# Patient Record
Sex: FEMALE | Race: WHITE
[De-identification: names, ages, dates, MRNs, and addresses within clinical notes are randomized per-mention and may not be internally consistent; named-entity substitution may affect disease eponyms.]

---

## 2017-09-25 ENCOUNTER — HOSPITAL ENCOUNTER (OUTPATIENT)
Dept: HOSPITAL 61 - PCVCIMAG | Age: 62
Discharge: HOME | End: 2017-09-25
Attending: INTERNAL MEDICINE
Payer: COMMERCIAL

## 2017-09-25 DIAGNOSIS — I10: ICD-10-CM

## 2017-09-25 DIAGNOSIS — I48.91: Primary | ICD-10-CM

## 2017-09-25 PROCEDURE — 93325 DOPPLER ECHO COLOR FLOW MAPG: CPT

## 2017-09-25 PROCEDURE — 93351 STRESS TTE COMPLETE: CPT

## 2017-09-25 NOTE — PCVCIMAG
--------------- APPROVED REPORT --------------





Exam:  Stress Echocardiogram

Indication: Atrial Fibrillation, hypertension,

Patient Location: Echo lab

Stress Nurse: Judy Montiel RN

Status: routine



Ht: 5 ft 7 in  

HR: 78 bpm      BP: 140/88 mmHg

Rhythm: NSR



Procedure

The patient underwent an Exercise Stress Test using the Chaparro 

Protocol. Blood pressure, heart rate, and EKG were monitored.

An Echocardiogram was performed by technician in four stages in quad 

fashion.  At peak stress, four selected images were obtained and 

placed side by side with resting images for comparison.



Stress Test Details

Stress Test:  Exercise stress testing was performed using a Chaparro 

protocol.

HR

Resting HR:            78 bpmMax Heart Rate (APMHR): 159 bpm 

Max HR Achieved:  153 bpmTarget HR (85% APMHR): 135 bpm

% of APMHR:         96

HR response to stress: Normal HR response to stress



BP

Resting BP:  140/88 mmHg

Max BP:       160/88 mmHg



ECG

Resting ECG:  Sinus Rhythm

Stress ECG:     Sinus Rhythm

Arrhythmia:    APC's



Clinical

Reason for Termination: Maximal effort

Exercise duration: 9 min sec

Highest Stage Achieved: Stage 3: 3.4 mph at 14% grade. 

Exercise capacity: 10.40 METs



Pre-Stress Echo

The resting Echocardiogram showed normal left ventricular 

contractility with an estimated Ejection Fraction of about &gt;55%. 

Normal wall motion in all segments on baseline images.



Post-Stress Echo

The stress Echocardiogram showed normal left ventricular 

contractility with an estimated Ejection Fraction of about 60-65%. 

Normal augmentation of wall motion in all segments on post stress 

images.



Clinical

No clinical or ECG evidence for ischemia.



Conclusion

Clinical Response:  Non-ischemic

Exercise Capacity:  Average

Stress ECG Response:  Non-ischemic

Stress Echo Images:  Non-ischemic

The left ventricle is normal in size and wall thickness in both the 

rest and stress images.



Other Information

Study Quality: Adequate



&lt;Conclusion&gt;

The left ventricle is normal in size and wall thickness in both the 

rest and stress images.

## 2019-11-07 ENCOUNTER — HOSPITAL ENCOUNTER (OUTPATIENT)
Dept: HOSPITAL 61 - PCVCIMAG | Age: 64
Discharge: HOME | End: 2019-11-07
Attending: INTERNAL MEDICINE
Payer: COMMERCIAL

## 2019-11-07 DIAGNOSIS — Z88.0: ICD-10-CM

## 2019-11-07 DIAGNOSIS — E78.5: ICD-10-CM

## 2019-11-07 DIAGNOSIS — I48.0: ICD-10-CM

## 2019-11-07 DIAGNOSIS — Z88.8: ICD-10-CM

## 2019-11-07 DIAGNOSIS — Z91.041: ICD-10-CM

## 2019-11-07 DIAGNOSIS — Z80.9: ICD-10-CM

## 2019-11-07 DIAGNOSIS — Z72.89: ICD-10-CM

## 2019-11-07 DIAGNOSIS — I07.1: Primary | ICD-10-CM

## 2019-11-07 PROCEDURE — 93325 DOPPLER ECHO COLOR FLOW MAPG: CPT

## 2019-11-07 PROCEDURE — 93351 STRESS TTE COMPLETE: CPT

## 2019-11-07 NOTE — [facility]AG
--------------- APPROVED REPORT --------------





Study performed:  11/07/2019 14:59:46



Exam:  Stress Echocardiogram

Indication: Hyperlipidemia, Dyspnea , Hypertension

Patient Location: Echo lab

Stress Nurse: Judy Montiel RN

Room #: 1

Status: routine



Ht: 5 ft 7 in  

HR: 66 bpm      BP: 128/78 mmHg

Rhythm: NSR



Procedure

The patient underwent an Exercise Stress Test using the Chaparro 

Protocol. Blood pressure, heart rate, and EKG were monitored.

An Echocardiogram was performed by technician in four stages in quad 

fashion.  At peak stress, four selected images were obtained and 

placed side by side with resting images for comparison.



Stress Test Details

Stress Test:  Exercise stress testing was performed using a Chaparro 

protocol.

HR

Resting HR:            66 bpmMax Heart Rate (APMHR): 157 bpm 

Max HR Achieved:  155 bpmTarget HR (85% APMHR): 133 bpm

% of APMHR:         98

Recovery HR:            81 bpm

HR response to stress: Normal HR response to stress



BP

Resting BP:  128/78 mmHg

Max BP:       184/70 mmHg

Recovery BP:       144/70 mmHg

BP response to stress: Normal blood pressure response to 

stress.

ECG

Resting ECG:  Sinus Rhythm

Stress ECG:     Sinus Rhythm

Recovery ECG: Sinus Rhythm



Clinical

Reason for Termination: Maximal effort

Exercise duration: 7 min 43 sec

Highest Stage Achieved: Stage 3: 3.4 mph at 14% grade. 

Exercise capacity: 10.10 METs

Overall Exercise Capacity for Age: Good



Stress ECG Conclusion

ECG: Non-ischemic

Clinical: Non-ischemic



Pre-Stress Echo

The resting Echocardiogram showed normal left ventricular 

contractility with an estimated Ejection Fraction of about >55%. 

Normal wall motion in all segments on baseline images.



Post-Stress Echo

The stress Echocardiogram showed normal left ventricular 

contractility with an estimated Ejection Fraction of about 60-65%. 

Normal augmentation of wall motion in all segments on post stress 

images.



Clinical

No clinical or ECG evidence for ischemia. 

Trace mitral regurgitation, trace to mild tricuspid regurgitation. 

Aortic and pulmonic valves have no regurgitation  and no 

stenosis.

Pulmonary artery pressure is 27 mmHg.



Conclusion

Clinical Response:  Non-ischemic

Exercise Capacity:  Average

Stress ECG Response:  Non-ischemic

Stress Echo Images:  Non-ischemic

No clinical, EKG or echocardiographic evidence for ischemia. 

Normal stress echocardiogram with maximal exercise stress.



Other Information

Study Quality: Adequate



<Conclusion>

No clinical, EKG or echocardiographic evidence for ischemia. 

Normal stress echocardiogram with maximal exercise stress.

## 2022-01-17 ENCOUNTER — APPOINTMENT (RX ONLY)
Dept: URBAN - METROPOLITAN AREA CLINIC 76 | Facility: CLINIC | Age: 67
Setting detail: DERMATOLOGY
End: 2022-01-17

## 2022-01-17 DIAGNOSIS — L30.9 DERMATITIS, UNSPECIFIED: ICD-10-CM

## 2022-01-17 PROCEDURE — ? COUNSELING

## 2022-01-17 PROCEDURE — ? TREATMENT REGIMEN

## 2022-01-17 PROCEDURE — 99202 OFFICE O/P NEW SF 15 MIN: CPT

## 2022-01-17 PROCEDURE — ? PRESCRIPTION

## 2022-01-17 RX ORDER — CLOBETASOL PROPIONATE 0.5 MG/ML
SOLUTION TOPICAL
Qty: 50 | Refills: 0 | Status: ERX | COMMUNITY
Start: 2022-01-17

## 2022-01-17 RX ORDER — KETOCONAZOLE 20 MG/ML
SHAMPOO, SUSPENSION TOPICAL
Qty: 120 | Refills: 1 | Status: ERX | COMMUNITY
Start: 2022-01-17

## 2022-01-17 RX ADMIN — CLOBETASOL PROPIONATE: 0.5 SOLUTION TOPICAL at 00:00

## 2022-01-17 RX ADMIN — KETOCONAZOLE: 20 SHAMPOO, SUSPENSION TOPICAL at 00:00

## 2022-01-17 NOTE — PROCEDURE: TREATMENT REGIMEN
Detail Level: Zone
Initiate Treatment: Clobetasol scalp solution BID prn\\nKetoconazole shampoo 3 times a week

## 2022-02-16 ENCOUNTER — APPOINTMENT (RX ONLY)
Dept: URBAN - METROPOLITAN AREA CLINIC 76 | Facility: CLINIC | Age: 67
Setting detail: DERMATOLOGY
End: 2022-02-16

## 2022-02-16 DIAGNOSIS — L30.9 DERMATITIS, UNSPECIFIED: ICD-10-CM | Status: IMPROVED

## 2022-02-16 PROCEDURE — ? TREATMENT REGIMEN

## 2022-02-16 PROCEDURE — ? COUNSELING

## 2022-02-16 PROCEDURE — 99212 OFFICE O/P EST SF 10 MIN: CPT

## 2022-02-16 NOTE — PROCEDURE: COUNSELING
Detail Level: Detailed
Patient Specific Counseling (Will Not Stick From Patient To Patient): Discussed seborrheic dermatitis vs psoriasis.  If no improvement with topicals consider biopsy.

## 2022-02-16 NOTE — PROCEDURE: TREATMENT REGIMEN
Detail Level: Zone
Continue Regimen: Clobetasol scalp solution BID prn\\nKetoconazole shampoo 3 times a week prn